# Patient Record
Sex: FEMALE | Employment: OTHER | URBAN - METROPOLITAN AREA
[De-identification: names, ages, dates, MRNs, and addresses within clinical notes are randomized per-mention and may not be internally consistent; named-entity substitution may affect disease eponyms.]

---

## 2023-11-03 ENCOUNTER — LAB (OUTPATIENT)
Dept: LAB | Facility: LAB | Age: 68
End: 2023-11-03
Payer: MEDICARE

## 2023-11-03 DIAGNOSIS — I10 ESSENTIAL (PRIMARY) HYPERTENSION: Primary | ICD-10-CM

## 2023-11-03 LAB
ALBUMIN SERPL BCP-MCNC: 4.2 G/DL (ref 3.4–5)
ALP SERPL-CCNC: 66 U/L (ref 33–136)
ALT SERPL W P-5'-P-CCNC: 11 U/L (ref 7–45)
ANION GAP SERPL CALC-SCNC: 12 MMOL/L (ref 10–20)
AST SERPL W P-5'-P-CCNC: 16 U/L (ref 9–39)
BILIRUB SERPL-MCNC: 0.9 MG/DL (ref 0–1.2)
BUN SERPL-MCNC: 14 MG/DL (ref 6–23)
CALCIUM SERPL-MCNC: 9.6 MG/DL (ref 8.6–10.3)
CHLORIDE SERPL-SCNC: 105 MMOL/L (ref 98–107)
CHOLEST SERPL-MCNC: 215 MG/DL (ref 0–199)
CHOLESTEROL/HDL RATIO: 2.9
CO2 SERPL-SCNC: 29 MMOL/L (ref 21–32)
CREAT SERPL-MCNC: 0.93 MG/DL (ref 0.5–1.05)
GFR SERPL CREATININE-BSD FRML MDRD: 67 ML/MIN/1.73M*2
GLUCOSE SERPL-MCNC: 95 MG/DL (ref 74–99)
HDLC SERPL-MCNC: 73 MG/DL
LDLC SERPL CALC-MCNC: 119 MG/DL
NON HDL CHOLESTEROL: 142 MG/DL (ref 0–149)
POTASSIUM SERPL-SCNC: 4.1 MMOL/L (ref 3.5–5.3)
PROT SERPL-MCNC: 6.7 G/DL (ref 6.4–8.2)
SODIUM SERPL-SCNC: 142 MMOL/L (ref 136–145)
TRIGL SERPL-MCNC: 114 MG/DL (ref 0–149)
TSH SERPL-ACNC: 3.92 MIU/L (ref 0.44–3.98)
VLDL: 23 MG/DL (ref 0–40)

## 2023-11-03 PROCEDURE — 84443 ASSAY THYROID STIM HORMONE: CPT

## 2023-11-03 PROCEDURE — 36415 COLL VENOUS BLD VENIPUNCTURE: CPT

## 2023-11-03 PROCEDURE — 80053 COMPREHEN METABOLIC PANEL: CPT

## 2023-11-03 PROCEDURE — 80061 LIPID PANEL: CPT

## 2024-05-21 PROBLEM — K63.5 COLON POLYPS: Status: ACTIVE | Noted: 2022-04-11

## 2024-05-21 PROBLEM — E87.6 HYPOKALEMIA: Status: ACTIVE | Noted: 2024-05-21

## 2024-05-21 RX ORDER — CHOLECALCIFEROL (VITAMIN D3) 25 MCG
1 TABLET ORAL DAILY
COMMUNITY

## 2024-05-22 ENCOUNTER — OFFICE VISIT (OUTPATIENT)
Dept: PRIMARY CARE | Facility: CLINIC | Age: 69
End: 2024-05-22
Payer: MEDICARE

## 2024-05-22 VITALS
WEIGHT: 153 LBS | TEMPERATURE: 97.9 F | HEART RATE: 74 BPM | DIASTOLIC BLOOD PRESSURE: 82 MMHG | SYSTOLIC BLOOD PRESSURE: 124 MMHG | BODY MASS INDEX: 22.66 KG/M2 | HEIGHT: 69 IN

## 2024-05-22 DIAGNOSIS — Z12.31 SCREENING MAMMOGRAM FOR BREAST CANCER: ICD-10-CM

## 2024-05-22 DIAGNOSIS — I10 PRIMARY HYPERTENSION: ICD-10-CM

## 2024-05-22 DIAGNOSIS — E55.9 VITAMIN D DEFICIENCY: ICD-10-CM

## 2024-05-22 DIAGNOSIS — Z00.00 ROUTINE GENERAL MEDICAL EXAMINATION AT HEALTH CARE FACILITY: Primary | ICD-10-CM

## 2024-05-22 PROCEDURE — 1159F MED LIST DOCD IN RCRD: CPT | Performed by: FAMILY MEDICINE

## 2024-05-22 PROCEDURE — 3074F SYST BP LT 130 MM HG: CPT | Performed by: FAMILY MEDICINE

## 2024-05-22 PROCEDURE — 3079F DIAST BP 80-89 MM HG: CPT | Performed by: FAMILY MEDICINE

## 2024-05-22 PROCEDURE — G0439 PPPS, SUBSEQ VISIT: HCPCS | Performed by: FAMILY MEDICINE

## 2024-05-22 PROCEDURE — 1036F TOBACCO NON-USER: CPT | Performed by: FAMILY MEDICINE

## 2024-05-22 RX ORDER — BENAZEPRIL HYDROCHLORIDE 20 MG/1
20 TABLET ORAL DAILY
Qty: 90 TABLET | Refills: 1 | Status: SHIPPED | OUTPATIENT
Start: 2024-05-22

## 2024-05-22 ASSESSMENT — PATIENT HEALTH QUESTIONNAIRE - PHQ9
1. LITTLE INTEREST OR PLEASURE IN DOING THINGS: NOT AT ALL
SUM OF ALL RESPONSES TO PHQ9 QUESTIONS 1 AND 2: 0
2. FEELING DOWN, DEPRESSED OR HOPELESS: NOT AT ALL

## 2024-05-22 NOTE — PROGRESS NOTES
"    /82   Pulse 74   Temp 36.6 °C (97.9 °F)   Ht 1.74 m (5' 8.5\")   Wt 69.4 kg (153 lb)   BMI 22.93 kg/m²     Past Medical History:   Diagnosis Date    Personal history of other endocrine, nutritional and metabolic disease     History of hypokalemia       Patient Active Problem List   Diagnosis    Benign neoplasm of colon    Colon polyps    Hypertension    Hypokalemia       Current Outpatient Medications   Medication Sig Dispense Refill    benazepril (Lotensin) 20 mg tablet TAKE 1 TABLET DAILY 90 tablet 1    cholecalciferol (Vitamin D-3) 25 MCG (1000 UT) tablet Take 1 tablet (25 mcg) by mouth once daily.      loratadine 10 mg capsule 10 mg.      benazepril (Lotensin) 20 mg tablet Take 1 tablet (20 mg) by mouth once daily.       No current facility-administered medications for this visit.       CC/HPI/ASSESSMENT/PLAN    CC annual wellness visit    HPI patient 68-year-old female here for wellness visit.  No cognitive deficits noted.  Patient had colonoscopy April 2022 with Dr. Martini.  She will need repeat colonoscopy in 3 years.  She is due for mammography.  She will need blood work in November.  Blood pressure is under good control.  Requesting refills for medication.  Patient notes overall doing well.  She denies headache fever chest pain palpitation short of breath abdominal pain diarrhea blood in urine or stool fever chills.  Immunizations reviewed she declines vaccines    Exam calm eyes no jaundice mouth moist throat clear neck supple no LAD no carotid bruit.  Lungs CTA good air exchange.  CV RRR no murmur.  Abdomen soft EXTR no scoliosis Ext full ROM of all extremities no edema or cyanosis skin no rash neuro alert and oriented no focal neurologic deficits noted.  No cognitive deficits noted    A/P 1.  Annual wellness visit.  No cognitive deficits noted.  Mammogram ordered.  Colonoscopy up-to-date.  She declines vaccines.  Medicine refilled.  Blood work ordered.  Follow-up 6 months    There are no " "diagnoses linked to this encounter. Subjective   Reason for Visit: Licha Gomez is an 68 y.o. female here for a Medicare Wellness visit.          Reviewed all medications by prescribing practitioner or clinical pharmacist (such as prescriptions, OTCs, herbal therapies and supplements) and documented in the medical record.    HPI    Patient Care Team:  Estuardo Latif MD as PCP - General  Darrion Cintron DO as PCP - [facility] Attributed Provider     Review of Systems    Objective   Vitals:  /82   Pulse 74   Temp 36.6 °C (97.9 °F)   Ht 1.74 m (5' 8.5\")   Wt 69.4 kg (153 lb)   BMI 22.93 kg/m²       Physical Exam    Assessment/Plan   Problem List Items Addressed This Visit       Hypertension    Relevant Orders    Comprehensive Metabolic Panel    Lipid Panel    Thyroid Stimulating Hormone     Other Visit Diagnoses       Routine general medical examination at health care facility    -  Primary               "

## 2024-12-03 ENCOUNTER — LAB (OUTPATIENT)
Dept: LAB | Facility: LAB | Age: 69
End: 2024-12-03
Payer: MEDICARE

## 2024-12-03 DIAGNOSIS — I10 PRIMARY HYPERTENSION: ICD-10-CM

## 2024-12-03 DIAGNOSIS — E55.9 VITAMIN D DEFICIENCY: ICD-10-CM

## 2024-12-03 LAB
25(OH)D3 SERPL-MCNC: 42 NG/ML (ref 30–100)
ALBUMIN SERPL BCP-MCNC: 4.2 G/DL (ref 3.4–5)
ALP SERPL-CCNC: 67 U/L (ref 33–136)
ALT SERPL W P-5'-P-CCNC: 12 U/L (ref 7–45)
ANION GAP SERPL CALC-SCNC: 11 MMOL/L (ref 10–20)
AST SERPL W P-5'-P-CCNC: 17 U/L (ref 9–39)
BILIRUB SERPL-MCNC: 0.8 MG/DL (ref 0–1.2)
BUN SERPL-MCNC: 13 MG/DL (ref 6–23)
CALCIUM SERPL-MCNC: 9.4 MG/DL (ref 8.6–10.3)
CHLORIDE SERPL-SCNC: 105 MMOL/L (ref 98–107)
CHOLEST SERPL-MCNC: 237 MG/DL (ref 0–199)
CHOLESTEROL/HDL RATIO: 3.1
CO2 SERPL-SCNC: 29 MMOL/L (ref 21–32)
CREAT SERPL-MCNC: 0.91 MG/DL (ref 0.5–1.05)
EGFRCR SERPLBLD CKD-EPI 2021: 68 ML/MIN/1.73M*2
GLUCOSE SERPL-MCNC: 88 MG/DL (ref 74–99)
HDLC SERPL-MCNC: 76.8 MG/DL
LDLC SERPL CALC-MCNC: 136 MG/DL
NON HDL CHOLESTEROL: 160 MG/DL (ref 0–149)
POTASSIUM SERPL-SCNC: 4.4 MMOL/L (ref 3.5–5.3)
PROT SERPL-MCNC: 6.6 G/DL (ref 6.4–8.2)
SODIUM SERPL-SCNC: 141 MMOL/L (ref 136–145)
TRIGL SERPL-MCNC: 120 MG/DL (ref 0–149)
TSH SERPL-ACNC: 2.93 MIU/L (ref 0.44–3.98)
VLDL: 24 MG/DL (ref 0–40)

## 2024-12-03 PROCEDURE — 84443 ASSAY THYROID STIM HORMONE: CPT

## 2024-12-03 PROCEDURE — 82306 VITAMIN D 25 HYDROXY: CPT

## 2024-12-03 PROCEDURE — 36415 COLL VENOUS BLD VENIPUNCTURE: CPT

## 2024-12-03 PROCEDURE — 80061 LIPID PANEL: CPT

## 2024-12-03 PROCEDURE — 80053 COMPREHEN METABOLIC PANEL: CPT

## 2024-12-08 DIAGNOSIS — I10 PRIMARY HYPERTENSION: ICD-10-CM

## 2024-12-09 RX ORDER — BENAZEPRIL HYDROCHLORIDE 20 MG/1
20 TABLET ORAL DAILY
Qty: 90 TABLET | Refills: 1 | Status: SHIPPED | OUTPATIENT
Start: 2024-12-09 | End: 2024-12-13 | Stop reason: SDUPTHER

## 2024-12-11 ASSESSMENT — ENCOUNTER SYMPTOMS
NECK PAIN: 0
SHORTNESS OF BREATH: 0
PND: 0
SWEATS: 0
HEADACHES: 0
BLURRED VISION: 0
ORTHOPNEA: 0
HYPERTENSION: 1
PALPITATIONS: 0

## 2024-12-12 PROBLEM — H91.93 BILATERAL HEARING LOSS: Status: ACTIVE | Noted: 2024-12-12

## 2024-12-12 PROBLEM — H90.3 ASYMMETRICAL SENSORINEURAL HEARING LOSS: Status: ACTIVE | Noted: 2024-12-12

## 2024-12-12 PROBLEM — H65.199 ACUTE TRANSUDATIVE OTITIS MEDIA: Status: ACTIVE | Noted: 2024-12-12

## 2024-12-12 PROBLEM — H61.20 IMPACTED CERUMEN: Status: ACTIVE | Noted: 2024-12-12

## 2024-12-12 RX ORDER — FLUTICASONE PROPIONATE 50 MCG
SPRAY, SUSPENSION (ML) NASAL
COMMUNITY
End: 2024-12-13 | Stop reason: WASHOUT

## 2024-12-13 ENCOUNTER — APPOINTMENT (OUTPATIENT)
Dept: PRIMARY CARE | Facility: CLINIC | Age: 69
End: 2024-12-13
Payer: MEDICARE

## 2024-12-13 VITALS
BODY MASS INDEX: 22.36 KG/M2 | WEIGHT: 151 LBS | SYSTOLIC BLOOD PRESSURE: 120 MMHG | HEIGHT: 69 IN | DIASTOLIC BLOOD PRESSURE: 78 MMHG | HEART RATE: 98 BPM

## 2024-12-13 DIAGNOSIS — I10 PRIMARY HYPERTENSION: Primary | ICD-10-CM

## 2024-12-13 DIAGNOSIS — E55.9 VITAMIN D DEFICIENCY: ICD-10-CM

## 2024-12-13 DIAGNOSIS — E78.5 HYPERLIPIDEMIA, UNSPECIFIED HYPERLIPIDEMIA TYPE: ICD-10-CM

## 2024-12-13 PROBLEM — H65.199 ACUTE TRANSUDATIVE OTITIS MEDIA: Status: RESOLVED | Noted: 2024-12-12 | Resolved: 2024-12-13

## 2024-12-13 PROBLEM — H61.20 IMPACTED CERUMEN: Status: RESOLVED | Noted: 2024-12-12 | Resolved: 2024-12-13

## 2024-12-13 PROCEDURE — 1036F TOBACCO NON-USER: CPT | Performed by: FAMILY MEDICINE

## 2024-12-13 PROCEDURE — 1159F MED LIST DOCD IN RCRD: CPT | Performed by: FAMILY MEDICINE

## 2024-12-13 PROCEDURE — 99214 OFFICE O/P EST MOD 30 MIN: CPT | Performed by: FAMILY MEDICINE

## 2024-12-13 PROCEDURE — 1158F ADVNC CARE PLAN TLK DOCD: CPT | Performed by: FAMILY MEDICINE

## 2024-12-13 PROCEDURE — 3074F SYST BP LT 130 MM HG: CPT | Performed by: FAMILY MEDICINE

## 2024-12-13 PROCEDURE — 3078F DIAST BP <80 MM HG: CPT | Performed by: FAMILY MEDICINE

## 2024-12-13 PROCEDURE — 1123F ACP DISCUSS/DSCN MKR DOCD: CPT | Performed by: FAMILY MEDICINE

## 2024-12-13 PROCEDURE — 3008F BODY MASS INDEX DOCD: CPT | Performed by: FAMILY MEDICINE

## 2024-12-13 RX ORDER — BENAZEPRIL HYDROCHLORIDE 20 MG/1
20 TABLET ORAL DAILY
Qty: 90 TABLET | Refills: 1 | Status: SHIPPED | OUTPATIENT
Start: 2024-12-13

## 2024-12-13 ASSESSMENT — PATIENT HEALTH QUESTIONNAIRE - PHQ9
SUM OF ALL RESPONSES TO PHQ9 QUESTIONS 1 AND 2: 3
1. LITTLE INTEREST OR PLEASURE IN DOING THINGS: NOT AT ALL
2. FEELING DOWN, DEPRESSED OR HOPELESS: NEARLY EVERY DAY

## 2024-12-13 NOTE — PROGRESS NOTES
"    /78   Pulse 98   Ht 1.74 m (5' 8.5\")   Wt 68.5 kg (151 lb)   BMI 22.63 kg/m²     Past Medical History:   Diagnosis Date    HL (hearing loss) 2002    Hypertension 2004    Personal history of other endocrine, nutritional and metabolic disease     History of hypokalemia       Patient Active Problem List   Diagnosis    Benign neoplasm of colon    Colon polyps    Hypertension    Hypokalemia    Routine general medical examination at health care facility    Acute transudative otitis media    Asymmetrical sensorineural hearing loss    Bilateral hearing loss    Impacted cerumen       Current Outpatient Medications   Medication Sig Dispense Refill    benazepril (Lotensin) 20 mg tablet TAKE 1 TABLET ONCE DAILY 90 tablet 1    cholecalciferol (Vitamin D-3) 25 MCG (1000 UT) tablet Take 1 tablet (25 mcg) by mouth once daily.      fluticasone (Flonase) 50 mcg/actuation nasal spray Administer into affected nostril(s) once daily. (Patient not taking: Reported on 12/13/2024)      loratadine 10 mg capsule 10 mg. (Patient not taking: Reported on 12/13/2024)       No current facility-administered medications for this visit.       CC/HPI/ASSESSMENT/PLAN    CC follow-up medication    HPI patient 68-year-old history of hypertension, hyperlipidemia vitamin D deficiency.  Blood pressures been under excellent control.  Denies side effect of benazepril.  Patient requesting refills.  Denies headache fever chest pain palpitation short of breath abdominal pain.  Blood work form recently reviewed.  Cholesterol level elevated but the HDL is very elevated as well.  Vitamin D level under excellent control she remains on vitamin D supplementation.  Liver kidney function looks good.  All results reviewed.  ROS negative except noted above.    Exam: Vital stable eyes no jaundice neck supple no LAD goiter no carotid bruit lungs CTA CV RRR Ext no edema skin no rash    A/P 1.  Hypertension chronic stable.  #2 hyperlipidemia chronic stable 3 " vitamin D deficiency chronic stable.  Medicines refilled.  Blood work was reviewed.  Patient will follow-up 6 months    There are no diagnoses linked to this encounter.   Answers submitted by the patient for this visit:  High Blood Pressure Questionnaire (Submitted on 12/11/2024)  Chief Complaint: Hypertension  Chronicity: chronic  Onset: more than 1 year ago  Condition status: controlled  anxiety: No  blurred vision: No  chest pain: No  headaches: No  malaise/fatigue: No  neck pain: No  orthopnea: No  palpitations: No  peripheral edema: No  PND: No  shortness of breath: No  sweats: No  Agents associated with hypertension: no associated agents  CAD risks: no known risk factors  Compliance problems: no compliance problems

## 2025-03-19 DIAGNOSIS — I10 PRIMARY HYPERTENSION: ICD-10-CM

## 2025-03-28 RX ORDER — BENAZEPRIL HYDROCHLORIDE 20 MG/1
20 TABLET ORAL DAILY
Qty: 90 TABLET | Refills: 1 | Status: SHIPPED | OUTPATIENT
Start: 2025-03-28

## 2025-06-02 ENCOUNTER — TELEPHONE (OUTPATIENT)
Dept: PRIMARY CARE | Facility: CLINIC | Age: 70
End: 2025-06-02
Payer: MEDICARE

## 2025-06-02 DIAGNOSIS — Z12.31 SCREENING MAMMOGRAM FOR BREAST CANCER: Primary | ICD-10-CM

## 2025-10-27 ENCOUNTER — APPOINTMENT (OUTPATIENT)
Dept: PRIMARY CARE | Facility: CLINIC | Age: 70
End: 2025-10-27
Payer: MEDICARE